# Patient Record
Sex: MALE | Race: WHITE | Employment: STUDENT | URBAN - METROPOLITAN AREA
[De-identification: names, ages, dates, MRNs, and addresses within clinical notes are randomized per-mention and may not be internally consistent; named-entity substitution may affect disease eponyms.]

---
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
## 2023-01-16 ENCOUNTER — OFFICE VISIT (OUTPATIENT)
Facility: LOCATION | Age: 23
End: 2023-01-16
Payer: COMMERCIAL

## 2023-01-16 DIAGNOSIS — J32.8 OTHER CHRONIC SINUSITIS: ICD-10-CM

## 2023-01-16 DIAGNOSIS — J34.2 DEVIATED SEPTUM: ICD-10-CM

## 2023-01-16 DIAGNOSIS — J34.3 NASAL TURBINATE HYPERTROPHY: Primary | ICD-10-CM

## 2023-01-16 RX ORDER — AZELASTINE 1 MG/ML
2 SPRAY, METERED NASAL 2 TIMES DAILY
Qty: 30 ML | Refills: 3 | Status: SHIPPED | OUTPATIENT
Start: 2023-01-16

## 2023-01-16 RX ORDER — CEFUROXIME AXETIL 250 MG/1
250 TABLET ORAL 2 TIMES DAILY
Qty: 20 TABLET | Refills: 1 | Status: SHIPPED | OUTPATIENT
Start: 2023-01-16

## 2023-01-16 RX ORDER — PREDNISONE 20 MG/1
TABLET ORAL
Qty: 18 TABLET | Refills: 1 | Status: SHIPPED | OUTPATIENT
Start: 2023-01-16

## 2023-01-26 ENCOUNTER — PATIENT MESSAGE (OUTPATIENT)
Facility: LOCATION | Age: 23
End: 2023-01-26

## 2023-01-26 RX ORDER — SULFAMETHOXAZOLE AND TRIMETHOPRIM 800; 160 MG/1; MG/1
1 TABLET ORAL 2 TIMES DAILY
Qty: 20 TABLET | Refills: 0 | Status: SHIPPED | OUTPATIENT
Start: 2023-01-26

## 2023-01-26 RX ORDER — PREDNISONE 20 MG/1
TABLET ORAL
Qty: 18 TABLET | Refills: 1 | Status: SHIPPED | OUTPATIENT
Start: 2023-01-26

## 2023-02-21 RX ORDER — PREDNISONE 20 MG/1
TABLET ORAL
Qty: 18 TABLET | Refills: 1 | Status: SHIPPED | OUTPATIENT
Start: 2023-02-21

## 2023-02-21 RX ORDER — SULFAMETHOXAZOLE AND TRIMETHOPRIM 800; 160 MG/1; MG/1
1 TABLET ORAL 2 TIMES DAILY
Qty: 20 TABLET | Refills: 0 | Status: SHIPPED | OUTPATIENT
Start: 2023-02-21

## 2023-02-24 RX ORDER — SULFAMETHOXAZOLE AND TRIMETHOPRIM 800; 160 MG/1; MG/1
1 TABLET ORAL 2 TIMES DAILY
Qty: 20 TABLET | Refills: 0 | Status: SHIPPED | OUTPATIENT
Start: 2023-02-24

## 2023-03-22 ENCOUNTER — OFFICE VISIT (OUTPATIENT)
Facility: LOCATION | Age: 23
End: 2023-03-22
Payer: COMMERCIAL

## 2023-03-22 DIAGNOSIS — J34.2 DEVIATED SEPTUM: Primary | ICD-10-CM

## 2023-03-22 DIAGNOSIS — J34.3 NASAL TURBINATE HYPERTROPHY: ICD-10-CM

## 2023-03-22 PROCEDURE — 99214 OFFICE O/P EST MOD 30 MIN: CPT | Performed by: OTOLARYNGOLOGY

## 2023-03-29 DIAGNOSIS — J34.2 DEVIATED NASAL SEPTUM: Primary | ICD-10-CM

## 2023-03-29 DIAGNOSIS — J34.3 HYPERTROPHY OF NASAL TURBINATES: ICD-10-CM

## 2023-05-01 DIAGNOSIS — J34.3 HYPERTROPHY OF NASAL TURBINATES: ICD-10-CM

## 2023-05-01 DIAGNOSIS — J34.2 DEVIATED NASAL SEPTUM: Primary | ICD-10-CM

## 2023-05-15 RX ORDER — PHENOL 1.4 %
AEROSOL, SPRAY (ML) MUCOUS MEMBRANE
COMMUNITY

## 2023-05-26 ENCOUNTER — ANESTHESIA (OUTPATIENT)
Dept: SURGERY | Facility: HOSPITAL | Age: 23
End: 2023-05-26
Payer: COMMERCIAL

## 2023-05-26 ENCOUNTER — HOSPITAL ENCOUNTER (OUTPATIENT)
Facility: HOSPITAL | Age: 23
Setting detail: HOSPITAL OUTPATIENT SURGERY
Discharge: HOME | End: 2023-05-26
Attending: OTOLARYNGOLOGY | Admitting: OTOLARYNGOLOGY
Payer: COMMERCIAL

## 2023-05-26 ENCOUNTER — ANESTHESIA EVENT (OUTPATIENT)
Dept: SURGERY | Facility: HOSPITAL | Age: 23
End: 2023-05-26
Payer: COMMERCIAL

## 2023-05-26 VITALS
WEIGHT: 112 LBS | HEIGHT: 66 IN | RESPIRATION RATE: 16 BRPM | OXYGEN SATURATION: 100 % | BODY MASS INDEX: 18 KG/M2 | HEART RATE: 109 BPM | SYSTOLIC BLOOD PRESSURE: 140 MMHG | TEMPERATURE: 99 F | DIASTOLIC BLOOD PRESSURE: 79 MMHG

## 2023-05-26 DIAGNOSIS — J34.2 DEVIATED NASAL SEPTUM: ICD-10-CM

## 2023-05-26 DIAGNOSIS — J34.3 HYPERTROPHY OF NASAL TURBINATES: ICD-10-CM

## 2023-05-26 PROCEDURE — 30520 REPAIR OF NASAL SEPTUM: CPT | Performed by: OTOLARYNGOLOGY

## 2023-05-26 PROCEDURE — 09SM0ZZ REPOSITION NASAL SEPTUM, OPEN APPROACH: ICD-10-PCS | Performed by: OTOLARYNGOLOGY

## 2023-05-26 PROCEDURE — 09SL0ZZ REPOSITION NASAL TURBINATE, OPEN APPROACH: ICD-10-PCS | Performed by: OTOLARYNGOLOGY

## 2023-05-26 PROCEDURE — 30802 ABLATE INF TURBINATE SUBMUC: CPT | Performed by: OTOLARYNGOLOGY

## 2023-05-26 RX ORDER — ROCURONIUM BROMIDE 10 MG/ML
INJECTION, SOLUTION INTRAVENOUS AS NEEDED
Status: DISCONTINUED | OUTPATIENT
Start: 2023-05-26 | End: 2023-05-26 | Stop reason: SURG

## 2023-05-26 RX ORDER — SCOLOPAMINE TRANSDERMAL SYSTEM 1 MG/1
1 PATCH, EXTENDED RELEASE TRANSDERMAL ONCE
Status: DISCONTINUED | OUTPATIENT
Start: 2023-05-26 | End: 2023-05-26 | Stop reason: HOSPADM

## 2023-05-26 RX ORDER — MEPERIDINE HYDROCHLORIDE 25 MG/ML
12.5 INJECTION INTRAMUSCULAR; INTRAVENOUS; SUBCUTANEOUS AS NEEDED
Status: DISCONTINUED | OUTPATIENT
Start: 2023-05-26 | End: 2023-05-26

## 2023-05-26 RX ORDER — GLYCOPYRROLATE 0.2 MG/ML
INJECTION, SOLUTION INTRAMUSCULAR; INTRAVENOUS AS NEEDED
Status: DISCONTINUED | OUTPATIENT
Start: 2023-05-26 | End: 2023-05-26 | Stop reason: SURG

## 2023-05-26 RX ORDER — HYDROMORPHONE HYDROCHLORIDE 1 MG/ML
0.2 INJECTION, SOLUTION INTRAMUSCULAR; INTRAVENOUS; SUBCUTANEOUS EVERY 5 MIN PRN
Status: DISCONTINUED | OUTPATIENT
Start: 2023-05-26 | End: 2023-05-26

## 2023-05-26 RX ORDER — HYDROCODONE BITARTRATE AND ACETAMINOPHEN 5; 325 MG/1; MG/1
1 TABLET ORAL ONCE AS NEEDED
Status: DISCONTINUED | OUTPATIENT
Start: 2023-05-26 | End: 2023-05-26

## 2023-05-26 RX ORDER — ONDANSETRON 2 MG/ML
INJECTION INTRAMUSCULAR; INTRAVENOUS AS NEEDED
Status: DISCONTINUED | OUTPATIENT
Start: 2023-05-26 | End: 2023-05-26 | Stop reason: SURG

## 2023-05-26 RX ORDER — ACETAMINOPHEN 500 MG
1000 TABLET ORAL ONCE
Status: DISCONTINUED | OUTPATIENT
Start: 2023-05-26 | End: 2023-05-26 | Stop reason: HOSPADM

## 2023-05-26 RX ORDER — LIDOCAINE HYDROCHLORIDE AND EPINEPHRINE 10; 10 MG/ML; UG/ML
INJECTION, SOLUTION INFILTRATION; PERINEURAL AS NEEDED
Status: DISCONTINUED | OUTPATIENT
Start: 2023-05-26 | End: 2023-05-26

## 2023-05-26 RX ORDER — SODIUM CHLORIDE/ALOE VERA
GEL (GRAM) NASAL AS NEEDED
Status: DISCONTINUED | OUTPATIENT
Start: 2023-05-26 | End: 2023-05-26

## 2023-05-26 RX ORDER — ACETAMINOPHEN 500 MG
1000 TABLET ORAL ONCE AS NEEDED
Status: DISCONTINUED | OUTPATIENT
Start: 2023-05-26 | End: 2023-05-26

## 2023-05-26 RX ORDER — NEOSTIGMINE METHYLSULFATE 1 MG/ML
INJECTION, SOLUTION INTRAVENOUS AS NEEDED
Status: DISCONTINUED | OUTPATIENT
Start: 2023-05-26 | End: 2023-05-26 | Stop reason: SURG

## 2023-05-26 RX ORDER — HYDROMORPHONE HYDROCHLORIDE 1 MG/ML
0.6 INJECTION, SOLUTION INTRAMUSCULAR; INTRAVENOUS; SUBCUTANEOUS EVERY 5 MIN PRN
Status: DISCONTINUED | OUTPATIENT
Start: 2023-05-26 | End: 2023-05-26

## 2023-05-26 RX ORDER — HYDROMORPHONE HYDROCHLORIDE 1 MG/ML
0.4 INJECTION, SOLUTION INTRAMUSCULAR; INTRAVENOUS; SUBCUTANEOUS EVERY 5 MIN PRN
Status: DISCONTINUED | OUTPATIENT
Start: 2023-05-26 | End: 2023-05-26

## 2023-05-26 RX ORDER — HYDROCODONE BITARTRATE AND ACETAMINOPHEN 5; 325 MG/1; MG/1
2 TABLET ORAL ONCE AS NEEDED
Status: DISCONTINUED | OUTPATIENT
Start: 2023-05-26 | End: 2023-05-26

## 2023-05-26 RX ORDER — CEPHALEXIN 500 MG/1
500 CAPSULE ORAL 3 TIMES DAILY
Qty: 30 CAPSULE | Refills: 0 | Status: SHIPPED | OUTPATIENT
Start: 2023-05-26 | End: 2023-06-05

## 2023-05-26 RX ORDER — PROCHLORPERAZINE EDISYLATE 5 MG/ML
5 INJECTION INTRAMUSCULAR; INTRAVENOUS EVERY 8 HOURS PRN
Status: DISCONTINUED | OUTPATIENT
Start: 2023-05-26 | End: 2023-05-26

## 2023-05-26 RX ORDER — SODIUM CHLORIDE, SODIUM LACTATE, POTASSIUM CHLORIDE, CALCIUM CHLORIDE 600; 310; 30; 20 MG/100ML; MG/100ML; MG/100ML; MG/100ML
INJECTION, SOLUTION INTRAVENOUS CONTINUOUS
Status: DISCONTINUED | OUTPATIENT
Start: 2023-05-26 | End: 2023-05-26

## 2023-05-26 RX ORDER — DEXAMETHASONE SODIUM PHOSPHATE 4 MG/ML
VIAL (ML) INJECTION AS NEEDED
Status: DISCONTINUED | OUTPATIENT
Start: 2023-05-26 | End: 2023-05-26 | Stop reason: SURG

## 2023-05-26 RX ORDER — ONDANSETRON 2 MG/ML
4 INJECTION INTRAMUSCULAR; INTRAVENOUS EVERY 6 HOURS PRN
Status: DISCONTINUED | OUTPATIENT
Start: 2023-05-26 | End: 2023-05-26

## 2023-05-26 RX ORDER — NALOXONE HYDROCHLORIDE 0.4 MG/ML
80 INJECTION, SOLUTION INTRAMUSCULAR; INTRAVENOUS; SUBCUTANEOUS AS NEEDED
Status: DISCONTINUED | OUTPATIENT
Start: 2023-05-26 | End: 2023-05-26

## 2023-05-26 RX ORDER — HYDROCODONE BITARTRATE AND ACETAMINOPHEN 5; 325 MG/1; MG/1
1 TABLET ORAL EVERY 6 HOURS PRN
Qty: 40 TABLET | Refills: 0 | Status: SHIPPED | OUTPATIENT
Start: 2023-05-26

## 2023-05-26 RX ORDER — ONDANSETRON 4 MG/1
4 TABLET, ORALLY DISINTEGRATING ORAL EVERY 4 HOURS PRN
Qty: 10 TABLET | Refills: 1 | Status: SHIPPED | OUTPATIENT
Start: 2023-05-26

## 2023-05-26 RX ORDER — LIDOCAINE HYDROCHLORIDE 10 MG/ML
INJECTION, SOLUTION EPIDURAL; INFILTRATION; INTRACAUDAL; PERINEURAL AS NEEDED
Status: DISCONTINUED | OUTPATIENT
Start: 2023-05-26 | End: 2023-05-26 | Stop reason: SURG

## 2023-05-26 RX ADMIN — DEXAMETHASONE SODIUM PHOSPHATE 4 MG: 4 MG/ML VIAL (ML) INJECTION at 13:27:00

## 2023-05-26 RX ADMIN — GLYCOPYRROLATE 0.6 MG: 0.2 INJECTION, SOLUTION INTRAMUSCULAR; INTRAVENOUS at 13:43:00

## 2023-05-26 RX ADMIN — SODIUM CHLORIDE, SODIUM LACTATE, POTASSIUM CHLORIDE, CALCIUM CHLORIDE: 600; 310; 30; 20 INJECTION, SOLUTION INTRAVENOUS at 13:08:00

## 2023-05-26 RX ADMIN — LIDOCAINE HYDROCHLORIDE 50 MG: 10 INJECTION, SOLUTION EPIDURAL; INFILTRATION; INTRACAUDAL; PERINEURAL at 13:11:00

## 2023-05-26 RX ADMIN — ONDANSETRON 4 MG: 2 INJECTION INTRAMUSCULAR; INTRAVENOUS at 13:27:00

## 2023-05-26 RX ADMIN — NEOSTIGMINE METHYLSULFATE 3 MG: 1 INJECTION, SOLUTION INTRAVENOUS at 13:43:00

## 2023-05-26 RX ADMIN — SODIUM CHLORIDE, SODIUM LACTATE, POTASSIUM CHLORIDE, CALCIUM CHLORIDE: 600; 310; 30; 20 INJECTION, SOLUTION INTRAVENOUS at 13:58:00

## 2023-05-26 RX ADMIN — ROCURONIUM BROMIDE 20 MG: 10 INJECTION, SOLUTION INTRAVENOUS at 13:11:00

## 2023-05-26 NOTE — ANESTHESIA PROCEDURE NOTES
Airway  Date/Time: 5/26/2023 1:14 PM  Urgency: elective      General Information and Staff    Patient location during procedure: OR  Anesthesiologist: Liliana Sandhu MD  Performed: anesthesiologist   Performed by: Liliana Sandhu MD  Authorized by: Liliana Sandhu MD      Indications and Patient Condition  Indications for airway management: anesthesia  Sedation level: deep  Preoxygenated: yes  Patient position: sniffing  Mask difficulty assessment: 1 - vent by mask    Final Airway Details  Final airway type: endotracheal airway      Successful airway: ETT  Cuffed: yes   Successful intubation technique: direct laryngoscopy  Endotracheal tube insertion site: oral  Blade size: #3  ETT size (mm): 7.0    Cormack-Lehane Classification: grade IIA - partial view of glottis  Placement verified by: chest auscultation and capnometry   Measured from: lips  Number of attempts at approach: 1

## 2023-05-26 NOTE — DISCHARGE INSTRUCTIONS
Home Care Instructions  SINUS SURGERY (NOSE SURGERY)  MITCH Ivan. GIVE TO PATIENT PRE-OPERATIVELY    PAIN: There is some discomfort after surgery. It is more of an ache or pressure than actual pain. This pressure may actually increase during the first week. Headache, gum pressure, discomfort in the roof of the mouth and around your teeth are all considered normal. You will be given a prescription for Tylenol with Codeine for pain. Please use this as directed. You may also use over the counter Tylenol. Do not take aspirin or any medication containing aspirin. This could cause an increase in bleeding after surgery. It is normal for your head and nasal passages to feel stuffy after surgery. This will gradually decrease over the next few weeks, and your nasal breathing will be noticeably improved. NASAL DRAINAGE: There will be some bleeding from your nose after surgery. Two to three hours after surgery you will see a noticeable increase in bleeding. This is due to the medication wearing off that was used during surgery. This is normal, do not panic. Bleeding to some degree will continue until the packing is removed. A gauze dressing will be placed under your nose to absorb this. It may need to be changed several times during the first 24 hours after surgery. Any bright red bleeding that is heavy and continuous requiring changing of the dressing every five minutes should be reported to the doctor. MEDICATIONS: You will be given three prescriptions after surgery   1. Tylenol with codeine for pain   2. An antibiotic to prevent infection   3. Ocean Nasal Spray which can be purchases over the counter, start tomorrow, use at least 4 times per day. It is important to fill all of these prescriptions. You will use the Ocean Nasal Spray as often as possible over the next four to six weeks. POST-OP VISIT: It is important to keep all of your post-operative appointments.  The first appointment will be on the day after surgery. At this appointment the nasal packing will be removed. After this you may continue to drip blood for another day or two. Change the gauze dressing as needed. When the bleeding stops your sinuses will start to scab up. This is when you will start using the Ocean Nasal Spray. You will need to spray each nostril as often as possible. This will aid in your comfort and your healing time. It will also help to loosen secretions. Vaporizers and steam from the shower is beneficial. Your next office visit will be for your first cleaning. It may be helpful for you to take a pain medication about one hour before this visit. It is most important for a successful outcome that you come to all post-operative cleanings. These cleanings help prevent scarring and will aid in your return to healthier sinuses. ACTIVITY: You will need to rest a few days after surgery. Do not do any heavy lifting for one week. Do not participate in any heavy physical activity such as jogging, swimming, or aerobics for one week. If you need to blow your nose, do so gently, If you need to sneeze, do so with your mouth open. It may aid in your comfort to keep your head elevated on pillows when resting.     [place] [facility]

## 2023-05-26 NOTE — BRIEF OP NOTE
Pre-Operative Diagnosis: Deviated nasal septum [J34.2]  Hypertrophy of nasal turbinates [J34.3]     Post-Operative Diagnosis: Deviated nasal septum [Z72. 2]Hypertrophy of nasal turbinates [J34.3]      Procedure Performed:   Nasal Septoplasty; BILATERAL Out Fracture of Inferior Turbinates; Coblation Submucous Resection of BILATERAL Inferior Turbinates    Surgeon(s) and Role:     Sahra Napoles MD - Primary    Assistant(s):        Surgical Findings: septum severely deviated to the right     Specimen: septum     Estimated Blood Loss: No data recorded    Dictation Number:  baldemar Little MD  5/26/2023  1:54 PM

## 2023-05-30 ENCOUNTER — OFFICE VISIT (OUTPATIENT)
Facility: LOCATION | Age: 23
End: 2023-05-30
Payer: COMMERCIAL

## 2023-05-30 DIAGNOSIS — J34.3 NASAL TURBINATE HYPERTROPHY: ICD-10-CM

## 2023-05-30 DIAGNOSIS — J34.2 DEVIATED SEPTUM: Primary | ICD-10-CM

## 2023-05-30 PROCEDURE — 99024 POSTOP FOLLOW-UP VISIT: CPT | Performed by: OTOLARYNGOLOGY

## 2023-05-30 NOTE — PROGRESS NOTES
Patient is here for recheck after septoplasty and turbinate reduction and outfracture. He is actually feeling fairly normal.  Denies fevers chills nausea or vomiting. Physical exam: Right left side and nose are decongested with Afrin lidocaine. Septal splint is in place and was removed with suctioning the suture and straight forceps. The nose was suctioned free of mucus. Airway is widely patent. Patient is doing well. He will irrigate 4-5 times per day follow-up in approximately 2 weeks for recheck and further cleaning.

## 2023-05-30 NOTE — OPERATIVE REPORT
[facility]    PATIENT'S NAME: Saúl Whelan   ATTENDING PHYSICIAN: Robert Brito M.D. OPERATING PHYSICIAN: Robert Brito M.D. PATIENT ACCOUNT#:   [de-identified]    [facility]  MEDICAL RECORD #:   [id number]       YOB: 2000  ADMISSION DATE:       05/26/2023      OPERATION DATE:  05/26/2023    OPERATIVE REPORT    PREOPERATIVE DIAGNOSIS:  Deviated septum with bilateral inferior turbinate hypertrophy. POSTOPERATIVE DIAGNOSIS:  Deviated septum with bilateral inferior turbinate hypertrophy. PROCEDURE:  Nasal septoplasty with bilateral outfracture of inferior turbinates and bilateral turbinate Coblation. ANESTHESIA:  General.    OPERATIVE TECHNIQUE:  Patient taken to the operating room, placed in supine position. After orotracheal intubation, routine prep and drape, procedure was commenced. Then, 4% cocaine pledgets were placed in the nose and used to decongest and then approximately 5 mL of 1% lidocaine with epinephrine 1:100,000 was injected into the submucosal flap bilaterally. Each inferior turbinate was injected with further 2 mL of 1% lidocaine with epinephrine 1:100,000. First, the left-sided [place] incision was carried out through mucosa. Subperichondrial flaps were elevated and connected to the opposite side approximately 5 mm posterior to the incision. The overriding cartilage was removed with a combination of Antonio forceps and [employer] once elevated. The septum freely returned to midline and then the flap was closed with 3-0 chromic. Next, the inferior turbinate was treated with further 5 mL of 1% lidocaine with epinephrine 1:100,000. It was first infractured with a butter knife and then outfractured with butter knife. Each turbinate was treated from posterior to anterior and then superior, middle, and inferior plane with Coblation until noticeable reduction had been achieved. The nose was irrigated with normal saline.   No further bleeding was seen. Septal splint was secured with 2-0 silk and then Stammberger Sinu-Foam was instilled into the nose. Patient was awoken in the operating room and transferred to recovery room in stable condition.     Dictated By Campbell Matthews M.D.  d: 05/26/2023 13:59:07  t: 05/26/2023 19:04:07  Job [id number]  IM/

## 2023-06-13 ENCOUNTER — OFFICE VISIT (OUTPATIENT)
Facility: LOCATION | Age: 23
End: 2023-06-13
Payer: COMMERCIAL

## 2023-06-13 DIAGNOSIS — J34.3 NASAL TURBINATE HYPERTROPHY: ICD-10-CM

## 2023-06-13 DIAGNOSIS — J34.2 DEVIATED SEPTUM: Primary | ICD-10-CM

## 2023-06-13 PROCEDURE — 99024 POSTOP FOLLOW-UP VISIT: CPT | Performed by: OTOLARYNGOLOGY

## 2023-06-13 NOTE — PROGRESS NOTES
Patient is here for recheck after septoplasty bilateral turbinate reduction. He is breathing quite well. Denies fevers chills or other constitutional complaints. Physical exam: Right left side nose are decongested with Afrin lidocaine. Each side was vacuumed free of only clear mucus. Septum is midline turbinates appear to be healing well. No crust need to be removed. Impression-patient doing well after septoplasty and turbinate surgery. He will return in 6 months for recheck he will continue using saline as needed.

## 2024-01-08 ENCOUNTER — OFFICE VISIT (OUTPATIENT)
Facility: LOCATION | Age: 24
End: 2024-01-08
Payer: COMMERCIAL

## 2024-01-08 DIAGNOSIS — J32.8 OTHER CHRONIC SINUSITIS: ICD-10-CM

## 2024-01-08 DIAGNOSIS — J34.3 NASAL TURBINATE HYPERTROPHY: ICD-10-CM

## 2024-01-08 DIAGNOSIS — J34.2 DEVIATED SEPTUM: Primary | ICD-10-CM

## 2024-01-08 PROCEDURE — 99212 OFFICE O/P EST SF 10 MIN: CPT | Performed by: OTOLARYNGOLOGY

## 2024-01-08 NOTE — PROGRESS NOTES
Rodrigue Cason is a 23 year old male.   Chief Complaint   Patient presents with    Nose Problem     HPI:   Surgery last year for septum and turbinates.  He is actually breathing quite well.  He has no specific complaints referable to the nose.  Current Outpatient Medications   Medication Sig Dispense Refill    HYDROcodone-acetaminophen (NORCO) 5-325 MG Oral Tab Take 1 tablet by mouth every 6 (six) hours as needed for Pain. or 2 tablets if really severe pain 40 tablet 0    ondansetron 4 MG Oral Tablet Dispersible Take 1 tablet (4 mg total) by mouth every 4 (four) hours as needed for Nausea. 10 tablet 1    Melatonin 10 MG Oral Tab Take by mouth.      azelastine 0.1 % Nasal Solution 2 sprays by Nasal route 2 (two) times daily. (Patient taking differently: 2 sprays by Nasal route daily as needed.) 30 mL 3    PARoxetine HCl 40 MG Oral Tab Take 1 tablet (40 mg total) by mouth every morning.        Past Medical History:   Diagnosis Date    Anxiety     Attention deficit disorder     Depression     Visual impairment       Social History:  Social History     Socioeconomic History    Marital status: Single   Tobacco Use    Smoking status: Never    Smokeless tobacco: Never   Vaping Use    Vaping Use: Never used   Substance and Sexual Activity    Alcohol use: No    Drug use: No      Past Surgical History:   Procedure Laterality Date    WISDOM TEETH REMOVED           REVIEW OF SYSTEMS:   GENERAL HEALTH: feels well otherwise  GENERAL : denies fever, chills, sweats, weight loss, weight gain  SKIN: denies any unusual skin lesions or rashes  RESPIRATORY: denies shortness of breath with exertion  NEURO: denies headaches    EXAM:   There were no vitals taken for this visit.  System Findings Details   Skin Normal Inspection - Normal.   Constitutional Normal Overall appearance - Normal.   Head/Face Normal Facial features - Normal. Eyebrows - Normal. Skull - Normal.   Oral/Oropharynx Normal Lips - Normal, Tonsils - Normal, Tongue -  Normal    Nasal Normal External nose - Normal. Nasal septum -near midline with very patent airway.   Neurological Normal Memory - Normal. Cranial nerves - Cranial nerves II through XII grossly intact.   Neck Exam Normal Inspection - Normal. Palpation - Normal. Parotid gland - Normal. Thyroid gland - Normal.   Psychiatric Normal Orientation - Oriented to time, place, person & situation. Appropriate mood and affect.   Lymph Detail Normal Submental. Submandibular. Anterior cervical. Posterior cervical. Supraclavicular.   Eyes Normal Conjunctiva - Right: Normal, Left: Normal. Pupil - Right: Normal, Left: Normal.    Ears Normal Inspection - Right: Normal, Left: Normal. Canal - Left: Normal. TM - Right: Normal, Left: Normal.     ASSESSMENT AND PLAN:   1. Deviated septum  Patient has had a good result from surgery.  Will follow-up when he has an acute infection.    2. Nasal turbinate hypertrophy  As above.    3. Other chronic sinusitis  As above.      The patient indicates understanding of these issues and agrees to the plan.      Pavel Cote MD  1/8/2024  1:36 PM

## (undated) DEVICE — SLEEVE KENDALL SCD EXPRESS MED

## (undated) DEVICE — NEEDLE SPINAL 22X3-1/2 BLK

## (undated) DEVICE — SINU FOAM: Brand: SINU-FOAM

## (undated) DEVICE — STANDARD HYPODERMIC NEEDLE,POLYPROPYLENE HUB: Brand: MONOJECT

## (undated) DEVICE — POSITIONER OR HEAD 9X8X4.5

## (undated) DEVICE — SINUS CDS: Brand: MEDLINE INDUSTRIES, INC.

## (undated) DEVICE — SUT SILK 2-0 X-1 737G

## (undated) DEVICE — STERILE POLYISOPRENE POWDER-FREE SURGICAL GLOVES: Brand: PROTEXIS

## (undated) DEVICE — NEEDLE SPINAL 25X3-1/2 BLUE

## (undated) DEVICE — SOL NACL IRRIG 0.9% 1000ML BTL

## (undated) DEVICE — ST. TONGUE BLADES: Brand: DEROYAL

## (undated) DEVICE — ENT COBLATOR II, REFLEX ULTRA PTR                                    WITH INTEGRATED CABLE: Brand: COBLATION

## (undated) DEVICE — SPLINT 1524050 5PK PAIR DOYLE II AIRWAY: Brand: DOYLE II ™

## (undated) DEVICE — SUT CHROMIC GUT 4-0 PS-2 1637G

## (undated) NOTE — LETTER
Date: 1/16/2019    Patient Name: Anay Esqueda          To Whom it may concern: This letter has been written at the patient's request. The above patient was seen at the [facility] for treatment of a medical condition.     This patient

## (undated) NOTE — LETTER
Date: 10/24/2018    Patient Name: Marla Bennett          To Whom it may concern: The above patient was seen at the [facility] for treatment of a medical condition. This patient should be excused from attending school on 10/24.

## (undated) NOTE — LETTER
Date: 5/22/2018    Patient Name: Piero Guzmán          To Whom it may concern: This letter has been written at the patient's request. The above patient was seen at the [facility] for treatment of a medical condition.     This patient